# Patient Record
Sex: FEMALE | Race: WHITE | Employment: FULL TIME | URBAN - METROPOLITAN AREA
[De-identification: names, ages, dates, MRNs, and addresses within clinical notes are randomized per-mention and may not be internally consistent; named-entity substitution may affect disease eponyms.]

---

## 2017-08-22 ENCOUNTER — OFFICE VISIT (OUTPATIENT)
Dept: FAMILY MEDICINE | Facility: CLINIC | Age: 37
End: 2017-08-22
Payer: COMMERCIAL

## 2017-08-22 VITALS
WEIGHT: 122.4 LBS | HEIGHT: 64 IN | OXYGEN SATURATION: 98 % | HEART RATE: 74 BPM | TEMPERATURE: 97.9 F | DIASTOLIC BLOOD PRESSURE: 74 MMHG | BODY MASS INDEX: 20.9 KG/M2 | SYSTOLIC BLOOD PRESSURE: 128 MMHG

## 2017-08-22 DIAGNOSIS — A09 TRAVELER'S DIARRHEA: ICD-10-CM

## 2017-08-22 DIAGNOSIS — Z71.84 TRAVEL ADVICE ENCOUNTER: Primary | ICD-10-CM

## 2017-08-22 PROCEDURE — 90471 IMMUNIZATION ADMIN: CPT | Performed by: FAMILY MEDICINE

## 2017-08-22 PROCEDURE — 99402 PREV MED CNSL INDIV APPRX 30: CPT | Mod: 25 | Performed by: FAMILY MEDICINE

## 2017-08-22 PROCEDURE — 90472 IMMUNIZATION ADMIN EACH ADD: CPT | Performed by: FAMILY MEDICINE

## 2017-08-22 PROCEDURE — 90746 HEPB VACCINE 3 DOSE ADULT IM: CPT | Performed by: FAMILY MEDICINE

## 2017-08-22 PROCEDURE — 90691 TYPHOID VACCINE IM: CPT | Performed by: FAMILY MEDICINE

## 2017-08-22 PROCEDURE — 90632 HEPA VACCINE ADULT IM: CPT | Performed by: FAMILY MEDICINE

## 2017-08-22 PROCEDURE — 90738 INACTIVATED JE VACC IM: CPT | Performed by: FAMILY MEDICINE

## 2017-08-22 RX ORDER — MEFLOQUINE HYDROCHLORIDE 250 MG/1
TABLET ORAL
Qty: 20 TABLET | Refills: 0 | Status: SHIPPED | OUTPATIENT
Start: 2017-08-22 | End: 2017-10-13

## 2017-08-22 RX ORDER — AZITHROMYCIN 500 MG/1
TABLET, FILM COATED ORAL
Qty: 30 TABLET | Refills: 0 | Status: SHIPPED | OUTPATIENT
Start: 2017-08-22 | End: 2017-10-13

## 2017-08-22 NOTE — PROGRESS NOTES
Itinerary:  [place], [place], [place], [place], [place], [place]   Departure Date: 12/15/2017, Return Date: 01/15/2018  Reason for Travel (i.e. business, pleasure): pleasures   Visiting an urban or rural area? Maybe  Accommodations (i.e. hotel, hostel, friends, family etc.): hotels and houses   Women - First day of your last period: 15 months ago    IMMUNIZATION HISTORY  Have you received any vaccinations in the past 4 weeks?  No   Have you ever fainted from having your blood drawn or from an injection?  No  Have you ever had a fever reaction to a vaccination?  No  Have you ever had any bad reaction / side effect from any vaccination?  No  Have you ever had hepatitis A or B vaccine?  No  Do you live (or work closely with anyone who has AIDS, or any other immune disorder, or who is on chemotherapy for cancer or family history of immunodeficiency?  No  Have you received any injection of immune globulin or any blood products during the past 12 months?  No    GENERAL MEDICAL HISTORY  Do you have a medical condition that warrants maintenance medication or physician follow-up?  No  Do you have a medical condition that is stable now, but that may recur while traveling?  No  Has your spleen been removed?   No  Have you had an acute illness or a fever in the past 48 hours?  No  Are you pregnant or might you become pregnant on this trip? Any chance of pregnancy?  No  Are you breastfeeding?  YES  Do you have HIV, AIDS, an AIDS-like condition, any other immune disorder, leukemia or cancer?  No  Do you have a severe combined immunodeficiency disease?  No  Have you had your thymus gland removed or a history of problems with your thymus, such as myasthenia gravis, DiGeorge syndrome, or thymoma?  No  Do you have severe thrombocytopenia (low platelet count) or blood clotting disorder?  No  Have you ever had a convulsion, seizure, epilepsy, neurologic condition or brain infection?  No  Do you have any stomach conditions?  No  Do  you have a G6PD deficiency?  No  Do you have severe renal or kidney impairment?  No  Do you have a history of psychiatric problems?  No  Do you have a problem with strange dreams and/or nightmares?  No  Do you have insomnia?  No  Do you have problems with vaginitis?  No  Do you have psoriasis?  No  Are you prone to motion sickness?  No  Have you ever had headaches, nausea, vomiting or breathing problems from altitude exposure?  No    MEDICATIONS  ARE YOU TAKING:  Steroids, prednisone, or anti-cancer drugs?  No  Antibiotics or sulfonamides?  No  Oral contraceptives?  YES  Aspirin therapy? (children & adolescents)  No    ALLERGIES  ARE YOU ALLERGIC TO:  Any medications?  No  Any foods or other?  No      Subjective:  Patient here for immunizations prior to traveling to several different countries in [place] and [place].  Patient denies any pain. symptoms of fever, cough or URI.  Objective:  Travel itinerary and immunization history completed.  Assessment:  International travel medical questionnaire completed.  Ok to give vaccines.      Immunizations discussed include: Hepatitis A, Hepatitis B, Typhoid and Japanese Encephalitis  Malaraia prophylaxis recommended: mefloquine  Symptomatic treatment for traveler's diarrhea: azithromycin     ASSESSMENT/PLAN:  No diagnosis found.  I have reviewed general recommendations for safe travel   including: food/water precautions, insect avoidance, safe sex   practices given high prevalence of HIV and other STDs,   roadway safety. Educational materials to [employer]   Traveler's health website have been provided.    Total time 30 minutes, greater than 50 percent in counseling   and coordination of care.

## 2017-08-22 NOTE — MR AVS SNAPSHOT
"              After Visit Summary   8/22/2017    Cierra Lepe    MRN: [id number]           Patient Information     Date Of Birth          1980        Visit Information        Provider Department      8/22/2017 8:00 AM Madai Begum MD [facility]        Today's Diagnoses     Travel advice encounter    -  1    Traveler's diarrhea          Care Instructions    See handout provided          Follow-ups after your visit        Who to contact     If you have questions or need follow up information about today's clinic visit or your schedule please contact [facility] directly at [phone].  Normal or non-critical lab and imaging results will be communicated to you by MyChart, letter or phone within 4 business days after the clinic has received the results. If you do not hear from us within 7 days, please contact the clinic through [employer] or phone. If you have a critical or abnormal lab result, we will notify you by phone as soon as possible.  Submit refill requests through [employer] or call your pharmacy and they will forward the refill request to us. Please allow 3 business days for your refill to be completed.          Additional Information About Your Visit        MyChart Information     [employer] gives you secure access to your electronic health record. If you see a primary care provider, you can also send messages to your care team and make appointments. If you have questions, please call your primary care clinic.  If you do not have a primary care provider, please call [phone] and they will assist you.        Care EveryWhere ID     This is your Care EveryWhere ID. This could be used by other organizations to access your [place] medical records  [id number]        Your Vitals Were     Pulse Temperature Height Pulse Oximetry BMI (Body Mass Index)       74 97.9  F (36.6  C) (Oral) 5' 4\" (1.626 m) 98% 21.01 kg/m2        Blood Pressure from Last 3 Encounters: "   08/22/17 128/74   10/27/16 116/72   05/19/14 104/60    Weight from Last 3 Encounters:   08/22/17 122 lb 6.4 oz (55.5 kg)   10/27/16 131 lb (59.4 kg)   05/19/14 125 lb (56.7 kg)              Today, you had the following     No orders found for display         Today's Medication Changes          These changes are accurate as of: 8/22/17 10:19 AM.  If you have any questions, ask your nurse or doctor.               Start taking these medicines.        Dose/Directions    azithromycin 500 MG tablet   Commonly known as:  ZITHROMAX   Used for:  Traveler's diarrhea   Started by:  Madai Begum MD        1 tablet daily x 3 days for traveler's diarrhea   Quantity:  30 tablet   Refills:  0       mefloquine 250 MG tablet   Commonly known as:  LARIAM   Used for:  Travel advice encounter   Started by:  Madai Begum MD        Take 1 weekly starting 2 weeks, through travel and 4 weeks on return   Quantity:  20 tablet   Refills:  0            Where to get your medicines      These medications were sent to [facility] [id number] [place] [facility] [place], [place] - [address]  [address] [facility] [address] [place] [address]     Phone:  [phone]     azithromycin 500 MG tablet    mefloquine 250 MG tablet                Primary Care Provider Office Phone # Fax #    Karen Weiler, [phone] [address]  [place] [address]        Equal Access to Services     [facility]: Hadii aad ku hadasho Soomaali, waaxda luqadaha, qaybta kaalmada adeegyada, waxay deric jurado . So [facility] [phone].    ATENCIÓN: Si habla español, tiene a rosa disposición servicios gratuitos de asistencia lingüística. Llame al [phone].    We comply with applicable federal civil rights laws and [place] laws. We do not discriminate on the basis of race, color, national origin, age, disability sex, sexual orientation or gender identity.            Thank you!     Thank you for choosing [facility] [place]  for your care. Our goal is always to provide you with excellent care. Hearing back from our patients is one way we can continue to improve our services. Please take a few minutes to complete the written survey that you may receive in the mail after your visit with us. Thank you!             Your Updated Medication List - Protect others around you: Learn how to safely use, store and throw away your medicines at www.disposemymeds.org.          This list is accurate as of: 8/22/17 10:19 AM.  Always use your most recent med list.                   Brand Name Dispense Instructions for use Diagnosis    azithromycin 500 MG tablet    ZITHROMAX    30 tablet    1 tablet daily x 3 days for traveler's diarrhea    Traveler's diarrhea       mefloquine 250 MG tablet    LARIAM    20 tablet    Take 1 weekly starting 2 weeks, through travel and 4 weeks on return    Travel advice encounter       norethindrone 0.35 MG per tablet    MICRONOR    84 tablet    Take 1 tablet (0.35 mg) by mouth daily    Encounter for initial prescription of contraceptive pills

## 2017-09-07 DIAGNOSIS — Z30.011 ENCOUNTER FOR INITIAL PRESCRIPTION OF CONTRACEPTIVE PILLS: ICD-10-CM

## 2017-09-07 RX ORDER — ACETAMINOPHEN AND CODEINE PHOSPHATE 120; 12 MG/5ML; MG/5ML
1 SOLUTION ORAL DAILY
Qty: 84 TABLET | Refills: 0 | Status: SHIPPED | OUTPATIENT
Start: 2017-09-07 | End: 2017-11-17

## 2017-09-07 NOTE — TELEPHONE ENCOUNTER
Norethindrone 0.35MG Tablet      Last Written Prescription Date: 10/27/2016  Last Fill Quantity: 84,  # refills: 3   Last Office Visit with [facility], [facility] or [facility] prescribing provider: 10/27/2016

## 2017-09-07 NOTE — TELEPHONE ENCOUNTER
Medication is being filled for 1 time refill only due to:  Patient needs to be seen because due for annual visit as of 10/27/17.   Alexia Childers RN, BSN  [facility]

## 2017-10-13 ENCOUNTER — OFFICE VISIT (OUTPATIENT)
Dept: FAMILY MEDICINE | Facility: CLINIC | Age: 37
End: 2017-10-13
Payer: COMMERCIAL

## 2017-10-13 VITALS
HEIGHT: 64 IN | DIASTOLIC BLOOD PRESSURE: 64 MMHG | SYSTOLIC BLOOD PRESSURE: 98 MMHG | HEART RATE: 111 BPM | BODY MASS INDEX: 21.34 KG/M2 | OXYGEN SATURATION: 97 % | TEMPERATURE: 98 F | WEIGHT: 125 LBS

## 2017-10-13 DIAGNOSIS — Z30.09 GENERAL COUNSELLING AND ADVICE ON CONTRACEPTION: Primary | ICD-10-CM

## 2017-10-13 PROCEDURE — 99213 OFFICE O/P EST LOW 20 MIN: CPT | Performed by: FAMILY MEDICINE

## 2017-10-13 NOTE — PROGRESS NOTES
"  SUBJECTIVE:   Cierra Lepe is a 37 year old female who presents to clinic today for the following health issues:      Contraception- Pt is here to discuss options for contraception.  May be traveling overseas over the next several months.  Wants easy form of contraception. Is currently on the Minipill.  Still occasionally breastfeeding her 2 year old. Not sure if she is done having children.  Thinking about IUD. .     Problem list and histories reviewed & adjusted, as indicated.  Additional history: as documented    Reviewed and updated as needed this visit by clinical staff  Tobacco  Allergies  Meds  Med Hx  Surg Hx  Fam Hx  Soc Hx      Reviewed and updated as needed this visit by Provider       ROS:  Constitutional, HEENT, cardiovascular, pulmonary, gi and gu systems are negative, except as otherwise noted.      OBJECTIVE:   BP 98/64  Pulse 111  Temp 98  F (36.7  C) (Oral)  Ht 1.626 m (5' 4\")  Wt 56.7 kg (125 lb)  SpO2 97%  BMI 21.46 kg/m2  Body mass index is 21.46 kg/(m^2).  GENERAL: healthy, alert and no distress  NECK: no adenopathy, no asymmetry, masses, or scars and thyroid normal to palpation  RESP: lungs clear to auscultation - no rales, rhonchi or wheezes  CV: regular rate and rhythm, normal S1 S2, no S3 or S4, no murmur, click or rub, no peripheral edema and peripheral pulses strong  ABDOMEN: soft, nontender, no hepatosplenomegaly, no masses and bowel sounds normal  MS: no gross musculoskeletal defects noted, no edema    Diagnostic Test Results:  none     ASSESSMENT/PLAN:   (Z30.09) General counselling and advice on contraception  (primary encounter diagnosis)  Comment: Discussed options.  Patient would like to proceed with Nexplanon.  Will schedule        Karen Weiler, MD  [facility] [place]  "

## 2017-10-13 NOTE — MR AVS SNAPSHOT
After Visit Summary   10/13/2017    Cierra Lepe    MRN: [id number]           Patient Information     Date Of Birth          1980        Visit Information        Provider Department      10/13/2017 10:40 AM Weiler, Karen, MD [facility]        Care Instructions    Nexplanon-arm     Mirena IUD    Paragard IUD          Follow-ups after your visit        Your next 10 appointments already scheduled     Oct 20, 2017  3:00 PM CDT   Travel Injection with AILEEN GOOD MA/LPN   [facility]    [address] [place] [address] [phone]              Who to contact     If you have questions or need follow up information about today's clinic visit or your schedule please contact [facility] [place] directly at [phone].  Normal or non-critical lab and imaging results will be communicated to you by MyChart, letter or phone within 4 business days after the clinic has received the results. If you do not hear from us within 7 days, please contact the clinic through MyChart or phone. If you have a critical or abnormal lab result, we will notify you by phone as soon as possible.  Submit refill requests through [employer] or call your pharmacy and they will forward the refill request to us. Please allow 3 business days for your refill to be completed.          Additional Information About Your Visit        MyChart Information     [employer] gives you secure access to your electronic health record. If you see a primary care provider, you can also send messages to your care team and make appointments. If you have questions, please call your primary care clinic.  If you do not have a primary care provider, please call [phone] and they will assist you.        Care EveryWhere ID     This is your Care EveryWhere ID. This could be used by other organizations to access your [place] medical records  [id number]        Your Vitals Were   "   Pulse Temperature Height Pulse Oximetry BMI (Body Mass Index)       111 98  F (36.7  C) (Oral) 5' 4\" (1.626 m) 97% 21.46 kg/m2        Blood Pressure from Last 3 Encounters:   10/13/17 98/64   08/22/17 128/74   10/27/16 116/72    Weight from Last 3 Encounters:   10/13/17 125 lb (56.7 kg)   08/22/17 122 lb 6.4 oz (55.5 kg)   10/27/16 131 lb (59.4 kg)              Today, you had the following     No orders found for display       Primary Care Provider Office Phone # Fax #    Karen Weiler, [phone] [address]  [place] [address]        Equal Access to Services     AMY LANDON : Hadii diana pressley hadasho Soomaali, waaxda luqadaha, qaybta kaalmada adeegyada, lionel leos haysage jurado . So [facility] [phone].    ATENCIÓN: Si habla español, tiene a rosa disposición servicios gratuitos de asistencia lingüística. Llame al [phone].    We comply with applicable federal civil rights laws and [place] laws. We do not discriminate on the basis of race, color, national origin, age, disability, sex, sexual orientation, or gender identity.            Thank you!     Thank you for choosing [facility]  for your care. Our goal is always to provide you with excellent care. Hearing back from our patients is one way we can continue to improve our services. Please take a few minutes to complete the written survey that you may receive in the mail after your visit with us. Thank you!             Your Updated Medication List - Protect others around you: Learn how to safely use, store and throw away your medicines at www.disposemymeds.org.          This list is accurate as of: 10/13/17 11:42 AM.  Always use your most recent med list.                   Brand Name Dispense Instructions for use Diagnosis    norethindrone 0.35 MG per tablet    MICRONOR    84 tablet    Take 1 tablet (0.35 mg) by mouth daily    Encounter for initial prescription of contraceptive pills         "

## 2017-10-13 NOTE — NURSING NOTE
"Chief Complaint   Patient presents with     Contraception       Initial BP 98/64  Pulse 111  Temp 98  F (36.7  C) (Oral)  Ht 5' 4\" (1.626 m)  Wt 125 lb (56.7 kg)  SpO2 97%  BMI 21.46 kg/m2 Estimated body mass index is 21.46 kg/(m^2) as calculated from the following:    Height as of this encounter: 5' 4\" (1.626 m).    Weight as of this encounter: 125 lb (56.7 kg).  Medication Reconciliation: complete   Su Montelongo Certified Medical Assistant    "

## 2017-11-10 ENCOUNTER — OFFICE VISIT (OUTPATIENT)
Dept: NURSING | Facility: CLINIC | Age: 37
End: 2017-11-10
Payer: COMMERCIAL

## 2017-11-10 DIAGNOSIS — Z23 NEED FOR PROPHYLACTIC VACCINATION WITH JAPANESE ENCEPHALITIS VACCINE: Primary | ICD-10-CM

## 2017-11-10 PROCEDURE — 90738 INACTIVATED JE VACC IM: CPT

## 2017-11-10 PROCEDURE — 90471 IMMUNIZATION ADMIN: CPT

## 2017-11-10 PROCEDURE — 99207 ZZC NO CHARGE NURSE ONLY: CPT

## 2017-11-10 NOTE — MR AVS SNAPSHOT
After Visit Summary   11/10/2017    Cierra Lepe    MRN: [id number]           Patient Information     Date Of Birth          1980        Visit Information        Provider Department      11/10/2017 3:00 PM AILEEN GOOD MA/DARYL [facility]        Today's Diagnoses     Need for prophylactic vaccination with Japanese encephalitis vaccine    -  1       Follow-ups after your visit        Who to contact     If you have questions or need follow up information about today's clinic visit or your schedule please contact [facility] directly at [phone].  Normal or non-critical lab and imaging results will be communicated to you by [employer] Inchart, letter or phone within 4 business days after the clinic has received the results. If you do not hear from us within 7 days, please contact the clinic through [employer] or phone. If you have a critical or abnormal lab result, we will notify you by phone as soon as possible.  Submit refill requests through [employer] or call your pharmacy and they will forward the refill request to us. Please allow 3 business days for your refill to be completed.          Additional Information About Your Visit        MyChart Information     [employer] gives you secure access to your electronic health record. If you see a primary care provider, you can also send messages to your care team and make appointments. If you have questions, please call your primary care clinic.  If you do not have a primary care provider, please call [phone] and they will assist you.        Care EveryWhere ID     This is your Care EveryWhere ID. This could be used by other organizations to access your [place] medical records  [id number]         Blood Pressure from Last 3 Encounters:   10/13/17 98/64   08/22/17 128/74   10/27/16 116/72    Weight from Last 3 Encounters:   10/13/17 125 lb (56.7 kg)   08/22/17 122 lb 6.4 oz (55.5 kg)   10/27/16 131 lb (59.4 kg)              We  Performed the Following     Sol[place] Islander ENCEPHALITIS IM 16 YO +        Primary Care Provider Office Phone # Fax #    Karen Weiler, [phone] [address]  [place] [address]        Equal Access to Services     TOM LANDON : Hadii diana ku lauryo Soboraali, waaxda luqadaha, qaybta kaalmada adeegyada, waxdameon baumann annabelle ramirez laConniesage carvalho. So [facility] [phone].    ATENCIÓN: Si habla español, tiene a rosa disposición servicios gratuitos de asistencia lingüística. Llame al [phone].    We comply with applicable federal civil rights laws and [place] laws. We do not discriminate on the basis of race, color, national origin, age, disability, sex, sexual orientation, or gender identity.            Thank you!     Thank you for choosing [facility]  for your care. Our goal is always to provide you with excellent care. Hearing back from our patients is one way we can continue to improve our services. Please take a few minutes to complete the written survey that you may receive in the mail after your visit with us. Thank you!             Your Updated Medication List - Protect others around you: Learn how to safely use, store and throw away your medicines at www.disposemymeds.org.          This list is accurate as of: 11/10/17  3:59 PM.  Always use your most recent med list.                   Brand Name Dispense Instructions for use Diagnosis    norethindrone 0.35 MG per tablet    MICRONOR    84 tablet    Take 1 tablet (0.35 mg) by mouth daily    Encounter for initial prescription of contraceptive pills

## 2017-11-10 NOTE — PROGRESS NOTES
Immunization History   Administered Date(s) Administered     HepA-Adult 08/22/2017     HepB-Adult 08/22/2017     Lao Encephalitis IM 08/22/2017     Typhoid IM 08/22/2017     Adela Mary, CMA     Statement Selected

## 2017-11-10 NOTE — NURSING NOTE
Immunization History   Administered Date(s) Administered     HepA-Adult 08/22/2017     HepB-Adult 08/22/2017     Icelandic Encephalitis IM 08/22/2017, 11/10/2017     Typhoid IM 08/22/2017     Adela Mary, CMA

## 2017-11-17 ENCOUNTER — OFFICE VISIT (OUTPATIENT)
Dept: FAMILY MEDICINE | Facility: CLINIC | Age: 37
End: 2017-11-17
Payer: COMMERCIAL

## 2017-11-17 VITALS
BODY MASS INDEX: 21.17 KG/M2 | HEIGHT: 64 IN | HEART RATE: 120 BPM | OXYGEN SATURATION: 99 % | WEIGHT: 124 LBS | SYSTOLIC BLOOD PRESSURE: 110 MMHG | DIASTOLIC BLOOD PRESSURE: 65 MMHG | TEMPERATURE: 98.3 F

## 2017-11-17 DIAGNOSIS — Z30.017 ENCOUNTER FOR INITIAL PRESCRIPTION OF IMPLANTABLE SUBDERMAL CONTRACEPTIVE: Primary | ICD-10-CM

## 2017-11-17 DIAGNOSIS — Z30.017 NEXPLANON INSERTION: ICD-10-CM

## 2017-11-17 LAB — BETA HCG QUAL IFA URINE: NEGATIVE

## 2017-11-17 PROCEDURE — 11981 INSERTION DRUG DLVR IMPLANT: CPT | Performed by: FAMILY MEDICINE

## 2017-11-17 PROCEDURE — 84703 CHORIONIC GONADOTROPIN ASSAY: CPT | Performed by: FAMILY MEDICINE

## 2017-11-17 NOTE — PATIENT INSTRUCTIONS
Etonogestrel implant  Brand Name: Nexplanon  What is this medicine?  ETONOGESTREL (et oh jaylyn KOFFI trel) is a contraceptive (birth control) device. It is used to prevent pregnancy. It can be used for up to 3 years.  How should I use this medicine?  This device is inserted just under the skin on the inner side of your upper arm by a health care professional.  Talk to your pediatrician regarding the use of this medicine in children. Special care may be needed.  What side effects may I notice from receiving this medicine?  Side effects that you should report to your doctor or health care professional as soon as possible:    allergic reactions like skin rash, itching or hives, swelling of the face, lips, or tongue    breast lumps    changes in emotions or moods    depressed mood    heavy or prolonged menstrual bleeding    pain, irritation, swelling, or bruising at the insertion site    scar at site of insertion    signs of infection at the insertion site such as fever, and skin redness, pain or discharge    signs of pregnancy    signs and symptoms of a blood clot such as breathing problems; changes in vision; chest pain; severe, sudden headache; pain, swelling, warmth in the leg; trouble speaking; sudden numbness or weakness of the face, arm or leg    signs and symptoms of liver injury like dark yellow or brown urine; general ill feeling or flu-like symptoms; light-colored stools; loss of appetite; nausea; right upper belly pain; unusually weak or tired; yellowing of the eyes or skin    unusual vaginal bleeding, discharge    signs and symptoms of a stroke like changes in vision; confusion; trouble speaking or understanding; severe headaches; sudden numbness or weakness of the face, arm or leg; trouble walking; dizziness; loss of balance or coordination  Side effects that usually do not require medical attention (report to your doctor or health care professional if they continue or are bothersome):    acne    back  pain    breast pain    changes in weight    dizziness    general ill feeling or flu-like symptoms    headache    irregular menstrual bleeding    nausea    sore throat    vaginal irritation or inflammation  What may interact with this medicine?  Do not take this medicine with any of the following medications:    amprenavir    bosentan    fosamprenavir  This medicine may also interact with the following medications:    barbiturate medicines for inducing sleep or treating seizures    certain medicines for fungal infections like ketoconazole and itraconazole    grapefruit juice    griseofulvin    medicines to treat seizures like carbamazepine, felbamate, oxcarbazepine, phenytoin, topiramate    modafinil    phenylbutazone    rifampin    rufinamide    some medicines to treat HIV infection like atazanavir, indinavir, lopinavir, nelfinavir, tipranavir, ritonavir    [place]'s wort  What if I miss a dose?  This does not apply.  Where should I keep my medicine?  This drug is given in a hospital or clinic and will not be stored at home.  What should I tell my health care provider before I take this medicine?  They need to know if you have any of these conditions:    abnormal vaginal bleeding    blood vessel disease or blood clots    cancer of the breast, cervix, or liver    depression    diabetes    gallbladder disease    headaches    heart disease or recent heart attack    high blood pressure    high cholesterol    kidney disease    liver disease    renal disease    seizures    tobacco smoker    an unusual or allergic reaction to etonogestrel, other hormones, anesthetics or antiseptics, medicines, foods, dyes, or preservatives    pregnant or trying to get pregnant    breast-feeding  What should I watch for while using this medicine?  This product does not protect you against HIV infection (AIDS) or other sexually transmitted diseases.  You should be able to feel the implant by pressing your fingertips over the skin where it  was inserted. Contact your doctor if you cannot feel the implant, and use a non-hormonal birth control method (such as condoms) until your doctor confirms that the implant is in place. If you feel that the implant may have broken or become bent while in your arm, contact your healthcare provider.  NOTE:This sheet is a summary. It may not cover all possible information. If you have questions about this medicine, talk to your doctor, pharmacist, or health care provider. Copyright  2017 Elsevier          1 Keep pressure bandage on for the next 24 hours. Afterwards, can cover with Band-Aid

## 2017-11-17 NOTE — PROGRESS NOTES
SUBJECTIVE:                                                    Cierra Lepe is a 37 year old female who presents to clinic today for the following health issues:    Nexplanon Insertion Procedure Note    Pre-operative Diagnosis: Desires contraception. Pt has been using Micronor  Post-operative Diagnosis: same   Indications: Presents today for Nexplanon insertion.    Consent: As part of the consent process for Nexplanon placement, the nature of the procedure was described to the patient. The risks of the procedure were discussed to be infection, bleeding. Side effect of irregular bleeding was also emphasized as was the need for additional protection to prevent sexually transmitted infections. The patient was given alternatives to include other forms of contraception. The patients questions where answered, and she agrees to proceed.     Procedure Details:   Urine pregnancy test: Negative  Patient's left forearm was cleansed with Betadine. Local anesthesia was achieved with 1 % lidocaine.   Nexplanon confirmed within applicator.   Nexplanon inserted under skin via applicator.   Placement verified by palpation of implanon at insertion site and absence within applicator.   Minimal bleeding   Band-Aid applied and pressure bandage applied    Condition: Stable   Complications: None       Problem list and histories reviewed & adjusted, as indicated.  Additional history: as documented    Patient Active Problem List   Diagnosis     CARDIOVASCULAR SCREENING; LDL GOAL LESS THAN 160     Past Surgical History:   Procedure Laterality Date     THYROIDECTOMY         Social History   Substance Use Topics     Smoking status: Never Smoker     Smokeless tobacco: Never Used     Alcohol use No     Family History   Problem Relation Age of Onset     Breast Cancer Paternal Aunt      45-50     CANCER Paternal Grandfather      unknown etiology     Colon Cancer No family hx of      OSTEOPOROSIS No family hx of          Current Outpatient  "Prescriptions   Medication Sig Dispense Refill     etonogestrel (IMPLANON/NEXPLANON) 68 MG IMPL 1 each (68 mg) by Subdermal route continuous  0         ROS:  Constitutional, HEENT, cardiovascular, pulmonary, gi and gu systems are negative, except as otherwise noted.      OBJECTIVE:   /65 (BP Location: Left arm, Patient Position: Chair, Cuff Size: Adult Regular)  Pulse 120  Temp 98.3  F (36.8  C) (Oral)  Ht 5' 4\" (1.626 m)  Wt 124 lb (56.2 kg)  SpO2 99%  BMI 21.28 kg/m2  Body mass index is 21.28 kg/(m^2).  GENERAL: healthy, alert and no distress  MS: no gross musculoskeletal defects noted, no edema  SKIN: no suspicious lesions or rashes, Nexplanon palpated in left upper arm s/p insertion    Diagnostic Test Results:  UPT: negative.    ASSESSMENT/PLAN:   1. Encounter for initial prescription of implantable subdermal contraceptive: Nexplanon NDC 8103-5075-21, Lot [id number] placed in left arm as described in above procedure note. No complications. Due for removal in 3 years - 11/17/2020. She will stop taking Micronor.  - Beta HCG qual IFA urine  - ETONOGESTREL IMPLANT SYSTEM  - etonogestrel (IMPLANON/NEXPLANON) 68 MG IMPL; 1 each (68 mg) by Subdermal route continuous; Refill: 0  - INSERTION NON-BIODEGRADABLE DRUG DELIVERY IMPLANT    2. Nexplanon insertion  - ETONOGESTREL IMPLANT SYSTEM  - etonogestrel (IMPLANON/NEXPLANON) 68 MG IMPL; 1 each (68 mg) by Subdermal route continuous; Refill: 0  - INSERTION NON-BIODEGRADABLE DRUG DELIVERY IMPLANT      Guillermo Dang, [facility]  "

## 2017-11-17 NOTE — MR AVS SNAPSHOT
After Visit Summary   11/17/2017    Cierra Lepe    MRN: [id number]           Patient Information     Date Of Birth          1980        Visit Information        Provider Department      11/17/2017 10:00 AM Guillermo Dang DO; [facility]  1 [facility] [place]        Today's Diagnoses     Encounter for initial prescription of implantable subdermal contraceptive    -  1    Nexplanon insertion          Care Instructions      Etonogestrel implant  Brand Name: Nexplanon  What is this medicine?  ETONOGESTREL (et oh jaylyn KOFFI trel) is a contraceptive (birth control) device. It is used to prevent pregnancy. It can be used for up to 3 years.  How should I use this medicine?  This device is inserted just under the skin on the inner side of your upper arm by a health care professional.  Talk to your pediatrician regarding the use of this medicine in children. Special care may be needed.  What side effects may I notice from receiving this medicine?  Side effects that you should report to your doctor or health care professional as soon as possible:    allergic reactions like skin rash, itching or hives, swelling of the face, lips, or tongue    breast lumps    changes in emotions or moods    depressed mood    heavy or prolonged menstrual bleeding    pain, irritation, swelling, or bruising at the insertion site    scar at site of insertion    signs of infection at the insertion site such as fever, and skin redness, pain or discharge    signs of pregnancy    signs and symptoms of a blood clot such as breathing problems; changes in vision; chest pain; severe, sudden headache; pain, swelling, warmth in the leg; trouble speaking; sudden numbness or weakness of the face, arm or leg    signs and symptoms of liver injury like dark yellow or brown urine; general ill feeling or flu-like symptoms; light-colored stools; loss of appetite; nausea; right upper belly pain; unusually weak or tired; yellowing of the eyes or  skin    unusual vaginal bleeding, discharge    signs and symptoms of a stroke like changes in vision; confusion; trouble speaking or understanding; severe headaches; sudden numbness or weakness of the face, arm or leg; trouble walking; dizziness; loss of balance or coordination  Side effects that usually do not require medical attention (report to your doctor or health care professional if they continue or are bothersome):    acne    back pain    breast pain    changes in weight    dizziness    general ill feeling or flu-like symptoms    headache    irregular menstrual bleeding    nausea    sore throat    vaginal irritation or inflammation  What may interact with this medicine?  Do not take this medicine with any of the following medications:    amprenavir    bosentan    fosamprenavir  This medicine may also interact with the following medications:    barbiturate medicines for inducing sleep or treating seizures    certain medicines for fungal infections like ketoconazole and itraconazole    grapefruit juice    griseofulvin    medicines to treat seizures like carbamazepine, felbamate, oxcarbazepine, phenytoin, topiramate    modafinil    phenylbutazone    rifampin    rufinamide    some medicines to treat HIV infection like atazanavir, indinavir, lopinavir, nelfinavir, tipranavir, ritonavir    [place]'s wort  What if I miss a dose?  This does not apply.  Where should I keep my medicine?  This drug is given in a hospital or clinic and will not be stored at home.  What should I tell my health care provider before I take this medicine?  They need to know if you have any of these conditions:    abnormal vaginal bleeding    blood vessel disease or blood clots    cancer of the breast, cervix, or liver    depression    diabetes    gallbladder disease    headaches    heart disease or recent heart attack    high blood pressure    high cholesterol    kidney disease    liver disease    renal disease    seizures    tobacco  smoker    an unusual or allergic reaction to etonogestrel, other hormones, anesthetics or antiseptics, medicines, foods, dyes, or preservatives    pregnant or trying to get pregnant    breast-feeding  What should I watch for while using this medicine?  This product does not protect you against HIV infection (AIDS) or other sexually transmitted diseases.  You should be able to feel the implant by pressing your fingertips over the skin where it was inserted. Contact your doctor if you cannot feel the implant, and use a non-hormonal birth control method (such as condoms) until your doctor confirms that the implant is in place. If you feel that the implant may have broken or become bent while in your arm, contact your healthcare provider.  NOTE:This sheet is a summary. It may not cover all possible information. If you have questions about this medicine, talk to your doctor, pharmacist, or health care provider. Copyright  2017 Elsevier          1 Keep pressure bandage on for the next 24 hours. Afterwards, can cover with Band-Aid          Follow-ups after your visit        Who to contact     If you have questions or need follow up information about today's clinic visit or your schedule please contact [facility] [place] directly at [phone].  Normal or non-critical lab and imaging results will be communicated to you by MyChart, letter or phone within 4 business days after the clinic has received the results. If you do not hear from us within 7 days, please contact the clinic through adFreeqhart or phone. If you have a critical or abnormal lab result, we will notify you by phone as soon as possible.  Submit refill requests through [employer] or call your pharmacy and they will forward the refill request to us. Please allow 3 business days for your refill to be completed.          Additional Information About Your Visit        [employer] Information     [employer] gives you secure access to your electronic health record. If you  "see a primary care provider, you can also send messages to your care team and make appointments. If you have questions, please call your primary care clinic.  If you do not have a primary care provider, please call [phone] and they will assist you.        Care EveryWhere ID     This is your Care EveryWhere ID. This could be used by other organizations to access your [place] medical records  [id number]        Your Vitals Were     Pulse Temperature Height Pulse Oximetry BMI (Body Mass Index)       120 98.3  F (36.8  C) (Oral) 5' 4\" (1.626 m) 99% 21.28 kg/m2        Blood Pressure from Last 3 Encounters:   11/17/17 110/65   10/13/17 98/64   08/22/17 128/74    Weight from Last 3 Encounters:   11/17/17 124 lb (56.2 kg)   10/13/17 125 lb (56.7 kg)   08/22/17 122 lb 6.4 oz (55.5 kg)              We Performed the Following     Beta HCG qual IFA urine        Primary Care Provider Office Phone # Fax #    Karen Weiler, [phone] [address]  [place] [address]        Equal Access to Services     [facility]: Hadii aad ku hadasho Soboraali, waaxda luqadaha, qaybta kaalmada adeegyada, lionel baumann annabelle ramirez laceferinon ah. So [facility] [phone].    ATENCIÓN: Si habla español, tiene a rosa disposición servicios gratuitos de asistencia lingüística. [facility] [phone].    We comply with applicable federal civil rights laws and [place] laws. We do not discriminate on the basis of race, color, national origin, age, disability, sex, sexual orientation, or gender identity.            Thank you!     Thank you for choosing [facility]  for your care. Our goal is always to provide you with excellent care. Hearing back from our patients is one way we can continue to improve our services. Please take a few minutes to complete the written survey that you may receive in the mail after your visit with us. Thank you!             Your Updated Medication List - Protect others around you: Learn " how to safely use, store and throw away your medicines at www.disposemymeds.org.          This list is accurate as of: 11/17/17 10:43 AM.  Always use your most recent med list.                   Brand Name Dispense Instructions for use Diagnosis    norethindrone 0.35 MG per tablet    MICRONOR    84 tablet    Take 1 tablet (0.35 mg) by mouth daily    Encounter for initial prescription of contraceptive pills

## 2017-11-17 NOTE — NURSING NOTE
"Chief Complaint   Patient presents with     Contraception     Initial /65 (BP Location: Left arm, Patient Position: Chair, Cuff Size: Adult Regular)  Pulse 120  Temp 98.3  F (36.8  C) (Oral)  Ht 5' 4\" (1.626 m)  Wt 124 lb (56.2 kg)  SpO2 99%  BMI 21.28 kg/m2 Estimated body mass index is 21.28 kg/(m^2) as calculated from the following:    Height as of this encounter: 5' 4\" (1.626 m).    Weight as of this encounter: 124 lb (56.2 kg).  BP completed using cuff size regular left Arm  Rosey Auuth CMA    "

## 2017-11-28 DIAGNOSIS — Z30.011 ENCOUNTER FOR INITIAL PRESCRIPTION OF CONTRACEPTIVE PILLS: ICD-10-CM

## 2017-11-28 NOTE — TELEPHONE ENCOUNTER
Requested Prescriptions   Pending Prescriptions Disp Refills     norethindrone (MICRONOR) 0.35 MG per tablet [Pharmacy Med Name: NORETHINDRONE 0.35 MG TABLET]  Discontinued  Patient now has Nexplanon.  Last Written Prescription Date:  9/7/2017  Last Fill Quantity: 84 tablet,  # refills: 0   Last Office Visit with [facility], [facility] or [facility] prescribing provider:  11/17/2017   Future Office Visit:      84 tablet 0     Sig: TAKE 1 TABLET (0.35 MG) BY MOUTH DAILY    Contraceptives Protocol Passed    11/28/2017  1:04 AM       Passed - Patient is not a current smoker if age is 35 or older       Passed - Recent or future visit with authorizing provider's specialty    Patient had office visit in the last year or has a visit in the next 30 days with authorizing provider.  See chart review.              Passed - No active pregnancy on record       Passed - No positive pregnancy test in past 12 months

## 2017-11-29 NOTE — TELEPHONE ENCOUNTER
Dr. Dang please advise does patient need to continue on birth control period of time after nexplanon insertion? Vonda Dobbs R.N.

## 2017-11-30 RX ORDER — ACETAMINOPHEN AND CODEINE PHOSPHATE 120; 12 MG/5ML; MG/5ML
SOLUTION ORAL
Qty: 84 TABLET | Refills: 0 | OUTPATIENT
Start: 2017-11-30

## 2018-01-12 ENCOUNTER — OFFICE VISIT (OUTPATIENT)
Dept: FAMILY MEDICINE | Facility: CLINIC | Age: 38
End: 2018-01-12
Payer: COMMERCIAL

## 2018-01-12 DIAGNOSIS — D18.01 CAPILLARY HEMANGIOMA OF SKIN: Primary | ICD-10-CM

## 2018-01-12 PROCEDURE — 99213 OFFICE O/P EST LOW 20 MIN: CPT | Performed by: FAMILY MEDICINE

## 2018-01-12 NOTE — MR AVS SNAPSHOT
"              After Visit Summary   1/12/2018    Cierra Lepe    MRN: [id number]           Patient Information     Date Of Birth          1980        Visit Information        Provider Department      1/12/2018 4:20 PM Piedad Morales MD [facility] - Primary Care Skin        Today's Diagnoses     Capillary hemangioma of skin    -  1      Care Instructions    FUTURE APPOINTMENTS  Follow up with laser treatment for destruction of hemangioma if the lesion continues to be irritated. Check with your insurance company for coverage of \"laser treatment recommended for a 4 mm hemangioma that has previously bled spontaneously.\"          Follow-ups after your visit        Who to contact     If you have questions or need follow up information about today's clinic visit or your schedule please contact [facility] directly at [phone].  Normal or non-critical lab and imaging results will be communicated to you by MyChart, letter or phone within 4 business days after the clinic has received the results. If you do not hear from us within 7 days, please contact the clinic through MyChart or phone. If you have a critical or abnormal lab result, we will notify you by phone as soon as possible.  Submit refill requests through [employer] or call your pharmacy and they will forward the refill request to us. Please allow 3 business days for your refill to be completed.          Additional Information About Your Visit        MyChart Information     [employer] gives you secure access to your electronic health record. If you see a primary care provider, you can also send messages to your care team and make appointments. If you have questions, please call your primary care clinic.  If you do not have a primary care provider, please call [phone] and they will assist you.        Care EveryWhere ID     This is your Care EveryWhere ID. This could be used by other organizations to access your [place] " medical records  [id number]         Blood Pressure from Last 3 Encounters:   11/17/17 110/65   10/13/17 98/64   08/22/17 128/74    Weight from Last 3 Encounters:   11/17/17 124 lb (56.2 kg)   10/13/17 125 lb (56.7 kg)   08/22/17 122 lb 6.4 oz (55.5 kg)              Today, you had the following     No orders found for display       Primary Care Provider Office Phone # Fax #    Karen Weiler, [phone] [address] [facility] [address]        Equal Access to Services     [facility]: Hadii aad ku hadasho Soomaali, waaxda luqadaha, qaybta kaalmada adeegyada, lionel leos haysage jurado . So [facility] [phone].    ATENCIÓN: Si habla español, tiene a rosa disposición servicios gratuitos de asistencia lingüística. Llame al [phone].    We comply with applicable federal civil rights laws and [place] laws. We do not discriminate on the basis of race, color, national origin, age, disability, sex, sexual orientation, or gender identity.            Thank you!     Thank you for choosing [facility] - [facility]  for your care. Our goal is always to provide you with excellent care. Hearing back from our patients is one way we can continue to improve our services. Please take a few minutes to complete the written survey that you may receive in the mail after your visit with us. Thank you!             Your Updated Medication List - Protect others around you: Learn how to safely use, store and throw away your medicines at www.disposemymeds.org.          This list is accurate as of: 1/12/18  4:37 PM.  Always use your most recent med list.                   Brand Name Dispense Instructions for use Diagnosis    etonogestrel 68 MG Impl    IMPLANON/NEXPLANON     1 each (68 mg) by Subdermal route continuous    Nexplanon insertion, Encounter for initial prescription of implantable subdermal contraceptive

## 2018-01-12 NOTE — PATIENT INSTRUCTIONS
"FUTURE APPOINTMENTS  Follow up with laser treatment for destruction of hemangioma if the lesion continues to be irritated. Check with your insurance company for coverage of \"laser treatment recommended for a 4 mm hemangioma that has previously bled spontaneously.\"  "

## 2018-01-12 NOTE — PROGRESS NOTES
"[facility] - PRIMARY CARE SKIN    CC : Lesion(s)  SUBJECTIVE:                                                    Cierra Lepe is a 37 year old female who presents to clinic today because of a lesion on the face.    Bothersome lesions noticed by the patient or other skin concerns :  Issue One : Blood vessel-like lesion on the chin has waxed and waned in size, almost fully resolving \"going down to nothing\". It has \"filled up\" two months ago before it popped and bled. This has been cyclical approximately every 6 months.  Onset : varies.  Enlarging : has changed in size.  Bleeding : YES - it has bled when popped  Itchy or irritating : YES.  Pain or tenderness : NO.  Changing color : NO.    Personal history of skin cancer : NO.  Family history of skin cancer : NO.    Sun Exposure History  Previous history of significant sun exposure:  Blistering sunburns : NO  Tanning beds : YES - 10 sessions/winter 5-10 years ago.  Sunscreen Use : YES, SPF : 30.  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    Refer to electronic medical record (EMR) for past medical history and medications.    INTEGUMENTARY/SKIN: POSITIVE for changing lesion  ROS : 14 point review of systems was negative except the symptoms listed above in the HPI.    This document serves as a record of the services and decisions personally performed and made by Jeny Morales MD. It was created on her behalf by Edwin Ken, a trained medical scribe.  The creation of this document is based on the scribe's personal observations and the provider's statements to the medical scribe.  Edwin Ken, January 12, 2018 4:24 PM      OBJECTIVE:                                                    GENERAL: healthy, alert and no distress  SKIN: Llamas Skin Type - II.  Face were examined. The dermatoscope was used to help evaluate pigmented lesions.  Skin " "Pertinent Findings:  Right mid-mandible submandibular area : 4 mm in size slightly raised, red lesion(s) consistent with capillary hemangioma.    Diagnostic Test Results:  none           ASSESSMENT:                                                      Encounter Diagnosis   Name Primary?     Capillary hemangioma of skin Yes         PLAN:                                                    Patient Instructions   FUTURE APPOINTMENTS  Follow up with laser treatment for destruction of hemangioma if the lesion continues to be irritated. Check with your insurance company for coverage of \"laser treatment recommended for a 4 mm hemangioma that has previously bled spontaneously.\"        PROCEDURES:                                                    None.    TT : 20 minutes.  CT : 15 minutes.      The information in this document, created by the medical scribe for me, accurately reflects the services I personally performed and the decisions made by me. I have reviewed and approved this document for accuracy prior to leaving the patient care area.  Jeny Morales MD January 12, 2018 4:24 PM  [facility] - PRIMARY CARE SKIN  "

## 2018-01-12 NOTE — LETTER
"    1/12/2018         RE: Cierra Lepe  [id number] [address] [facility] [address] [facility] [id number]        Dear Colleague,    Thank you for referring your patient, Cierra Lepe, to the [facility] - PRIMARY CARE SKIN. Please see a copy of my visit note below.    [facility] PRIMARY CARE SKIN    CC : Lesion(s)  SUBJECTIVE:                                                    Cierra Lepe is a 37 year old female who presents to clinic today because of a lesion on the face.    Bothersome lesions noticed by the patient or other skin concerns :  Issue One : Blood vessel-like lesion on the chin has waxed and waned in size, almost fully resolving \"going down to nothing\". It has \"filled up\" two months ago before it popped and bled. This has been cyclical approximately every 6 months.  Onset : varies.  Enlarging : has changed in size.  Bleeding : YES - it has bled when popped  Itchy or irritating : YES.  Pain or tenderness : NO.  Changing color : NO.    Personal history of skin cancer : NO.  Family history of skin cancer : NO.    Sun Exposure History  Previous history of significant sun exposure:  Blistering sunburns : NO  Tanning beds : YES - 10 sessions/winter 5-10 years ago.  Sunscreen Use : YES, SPF : 30.  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    Refer to electronic medical record (EMR) for past medical history and medications.    INTEGUMENTARY/SKIN: POSITIVE for changing lesion  ROS : 14 point review of systems was negative except the symptoms listed above in the HPI.    This document serves as a record of the services and decisions personally performed and made by Jeny Morales MD. It was created on her behalf by Edwin Ken, a trained medical scribe.  The creation of this document is based on the scribe's personal observations and the provider's statements to the medical scribe.  Edwin Ken, January 12, " "2018 4:24 PM      OBJECTIVE:                                                    GENERAL: healthy, alert and no distress  SKIN: Llamas Skin Type - II.  Face were examined. The dermatoscope was used to help evaluate pigmented lesions.  Skin Pertinent Findings:  Right mid-mandible submandibular area : 4 mm in size slightly raised, red lesion(s) consistent with capillary hemangioma.    Diagnostic Test Results:  none           ASSESSMENT:                                                      Encounter Diagnosis   Name Primary?     Capillary hemangioma of skin Yes         PLAN:                                                    Patient Instructions   FUTURE APPOINTMENTS  Follow up with laser treatment for destruction of hemangioma if the lesion continues to be irritated. Check with your insurance company for coverage of \"laser treatment recommended for a 4 mm hemangioma that has previously bled spontaneously.\"        PROCEDURES:                                                    None.    TT : 20 minutes.  CT : 15 minutes.      The information in this document, created by the medical scribe for me, accurately reflects the services I personally performed and the decisions made by me. I have reviewed and approved this document for accuracy prior to leaving the patient care area.  Jeny Morales MD January 12, 2018 4:24 PM  [facility] - PRIMARY CARE SKIN    Again, thank you for allowing me to participate in the care of your patient.        Sincerely,        Piedad Morales MD    "

## 2020-03-02 ENCOUNTER — HEALTH MAINTENANCE LETTER (OUTPATIENT)
Age: 40
End: 2020-03-02

## 2020-12-20 ENCOUNTER — HEALTH MAINTENANCE LETTER (OUTPATIENT)
Age: 40
End: 2020-12-20

## 2021-04-24 ENCOUNTER — HEALTH MAINTENANCE LETTER (OUTPATIENT)
Age: 41
End: 2021-04-24

## 2021-10-03 ENCOUNTER — HEALTH MAINTENANCE LETTER (OUTPATIENT)
Age: 41
End: 2021-10-03

## 2022-05-15 ENCOUNTER — HEALTH MAINTENANCE LETTER (OUTPATIENT)
Age: 42
End: 2022-05-15

## 2022-09-10 ENCOUNTER — HEALTH MAINTENANCE LETTER (OUTPATIENT)
Age: 42
End: 2022-09-10

## 2023-06-03 ENCOUNTER — HEALTH MAINTENANCE LETTER (OUTPATIENT)
Age: 43
End: 2023-06-03

## 2024-02-18 ENCOUNTER — HEALTH MAINTENANCE LETTER (OUTPATIENT)
Age: 44
End: 2024-02-18